# Patient Record
Sex: FEMALE | Race: WHITE | NOT HISPANIC OR LATINO | Employment: UNEMPLOYED | URBAN - NONMETROPOLITAN AREA
[De-identification: names, ages, dates, MRNs, and addresses within clinical notes are randomized per-mention and may not be internally consistent; named-entity substitution may affect disease eponyms.]

---

## 2022-07-15 ENCOUNTER — HOSPITAL ENCOUNTER (INPATIENT)
Facility: HOSPITAL | Age: 37
LOS: 1 days | Discharge: LEFT BEFORE DISCHARGE | End: 2022-07-16
Attending: STUDENT IN AN ORGANIZED HEALTH CARE EDUCATION/TRAINING PROGRAM | Admitting: INTERNAL MEDICINE
Payer: MEDICAID

## 2022-07-15 ENCOUNTER — APPOINTMENT (OUTPATIENT)
Dept: CT IMAGING | Facility: HOSPITAL | Age: 37
End: 2022-07-15
Attending: STUDENT IN AN ORGANIZED HEALTH CARE EDUCATION/TRAINING PROGRAM
Payer: MEDICAID

## 2022-07-15 DIAGNOSIS — J02.9 PHARYNGITIS, UNSPECIFIED ETIOLOGY: ICD-10-CM

## 2022-07-15 DIAGNOSIS — N10 PYELONEPHRITIS, ACUTE: ICD-10-CM

## 2022-07-15 DIAGNOSIS — A41.9 SEPSIS, DUE TO UNSPECIFIED ORGANISM, UNSPECIFIED WHETHER ACUTE ORGAN DYSFUNCTION PRESENT (H): ICD-10-CM

## 2022-07-15 LAB
GROUP A STREP BY PCR: NOT DETECTED
MONOCYTES NFR BLD AUTO: NEGATIVE %

## 2022-07-15 PROCEDURE — 86308 HETEROPHILE ANTIBODY SCREEN: CPT | Performed by: STUDENT IN AN ORGANIZED HEALTH CARE EDUCATION/TRAINING PROGRAM

## 2022-07-15 PROCEDURE — 36415 COLL VENOUS BLD VENIPUNCTURE: CPT | Performed by: STUDENT IN AN ORGANIZED HEALTH CARE EDUCATION/TRAINING PROGRAM

## 2022-07-15 PROCEDURE — 250N000009 HC RX 250: Performed by: STUDENT IN AN ORGANIZED HEALTH CARE EDUCATION/TRAINING PROGRAM

## 2022-07-15 PROCEDURE — 87077 CULTURE AEROBIC IDENTIFY: CPT | Performed by: STUDENT IN AN ORGANIZED HEALTH CARE EDUCATION/TRAINING PROGRAM

## 2022-07-15 PROCEDURE — 99285 EMERGENCY DEPT VISIT HI MDM: CPT | Performed by: STUDENT IN AN ORGANIZED HEALTH CARE EDUCATION/TRAINING PROGRAM

## 2022-07-15 PROCEDURE — 250N000013 HC RX MED GY IP 250 OP 250 PS 637: Performed by: STUDENT IN AN ORGANIZED HEALTH CARE EDUCATION/TRAINING PROGRAM

## 2022-07-15 PROCEDURE — 99285 EMERGENCY DEPT VISIT HI MDM: CPT | Mod: 25

## 2022-07-15 PROCEDURE — 250N000011 HC RX IP 250 OP 636: Performed by: STUDENT IN AN ORGANIZED HEALTH CARE EDUCATION/TRAINING PROGRAM

## 2022-07-15 PROCEDURE — 87081 CULTURE SCREEN ONLY: CPT | Performed by: STUDENT IN AN ORGANIZED HEALTH CARE EDUCATION/TRAINING PROGRAM

## 2022-07-15 PROCEDURE — 70491 CT SOFT TISSUE NECK W/DYE: CPT

## 2022-07-15 PROCEDURE — 87651 STREP A DNA AMP PROBE: CPT | Performed by: STUDENT IN AN ORGANIZED HEALTH CARE EDUCATION/TRAINING PROGRAM

## 2022-07-15 RX ORDER — DEXAMETHASONE SODIUM PHOSPHATE 10 MG/ML
10 INJECTION INTRAMUSCULAR; INTRAVENOUS ONCE
Status: COMPLETED | OUTPATIENT
Start: 2022-07-15 | End: 2022-07-15

## 2022-07-15 RX ORDER — IOPAMIDOL 755 MG/ML
75 INJECTION, SOLUTION INTRAVASCULAR ONCE
Status: COMPLETED | OUTPATIENT
Start: 2022-07-15 | End: 2022-07-15

## 2022-07-15 RX ORDER — ACETAMINOPHEN 325 MG/1
975 TABLET ORAL ONCE
Status: COMPLETED | OUTPATIENT
Start: 2022-07-15 | End: 2022-07-15

## 2022-07-15 RX ADMIN — DEXAMETHASONE SODIUM PHOSPHATE 10 MG: 10 INJECTION INTRAMUSCULAR; INTRAVENOUS at 22:54

## 2022-07-15 RX ADMIN — IOPAMIDOL 75 ML: 755 INJECTION, SOLUTION INTRAVENOUS at 23:21

## 2022-07-15 RX ADMIN — ACETAMINOPHEN 975 MG: 325 TABLET, FILM COATED ORAL at 22:54

## 2022-07-16 VITALS
HEART RATE: 97 BPM | RESPIRATION RATE: 18 BRPM | SYSTOLIC BLOOD PRESSURE: 97 MMHG | DIASTOLIC BLOOD PRESSURE: 55 MMHG | OXYGEN SATURATION: 94 % | HEIGHT: 63 IN | BODY MASS INDEX: 23.59 KG/M2 | TEMPERATURE: 98.9 F | WEIGHT: 133.16 LBS

## 2022-07-16 PROBLEM — J35.01 CHRONIC TONSILLITIS: Status: ACTIVE | Noted: 2022-07-16

## 2022-07-16 PROBLEM — N10 PYELONEPHRITIS, ACUTE: Status: ACTIVE | Noted: 2022-07-16

## 2022-07-16 PROBLEM — G47.10 HYPERSOMNOLENCE: Status: ACTIVE | Noted: 2022-07-16

## 2022-07-16 PROBLEM — R50.9 FEVER: Status: ACTIVE | Noted: 2022-07-16

## 2022-07-16 PROBLEM — J02.9 SORE THROAT: Status: ACTIVE | Noted: 2022-07-16

## 2022-07-16 PROCEDURE — 258N000003 HC RX IP 258 OP 636: Performed by: INTERNAL MEDICINE

## 2022-07-16 PROCEDURE — 258N000003 HC RX IP 258 OP 636

## 2022-07-16 PROCEDURE — 250N000011 HC RX IP 250 OP 636: Performed by: STUDENT IN AN ORGANIZED HEALTH CARE EDUCATION/TRAINING PROGRAM

## 2022-07-16 PROCEDURE — 250N000011 HC RX IP 250 OP 636

## 2022-07-16 PROCEDURE — 120N000001 HC R&B MED SURG/OB

## 2022-07-16 PROCEDURE — 96365 THER/PROPH/DIAG IV INF INIT: CPT

## 2022-07-16 PROCEDURE — 99235 HOSP IP/OBS SAME DATE MOD 70: CPT | Performed by: INTERNAL MEDICINE

## 2022-07-16 RX ORDER — DOXYCYCLINE 100 MG/10ML
INJECTION, POWDER, LYOPHILIZED, FOR SOLUTION INTRAVENOUS
Status: COMPLETED
Start: 2022-07-16 | End: 2022-07-16

## 2022-07-16 RX ORDER — NICOTINE 21 MG/24HR
1 PATCH, TRANSDERMAL 24 HOURS TRANSDERMAL AT BEDTIME
Status: DISCONTINUED | OUTPATIENT
Start: 2022-07-16 | End: 2022-07-16

## 2022-07-16 RX ORDER — NICOTINE 21 MG/24HR
2 PATCH, TRANSDERMAL 24 HOURS TRANSDERMAL AT BEDTIME
Status: DISCONTINUED | OUTPATIENT
Start: 2022-07-16 | End: 2022-07-16 | Stop reason: HOSPADM

## 2022-07-16 RX ORDER — LIDOCAINE 40 MG/G
CREAM TOPICAL
Status: DISCONTINUED | OUTPATIENT
Start: 2022-07-16 | End: 2022-07-16 | Stop reason: HOSPADM

## 2022-07-16 RX ORDER — SODIUM CHLORIDE, SODIUM LACTATE, POTASSIUM CHLORIDE, CALCIUM CHLORIDE 600; 310; 30; 20 MG/100ML; MG/100ML; MG/100ML; MG/100ML
INJECTION, SOLUTION INTRAVENOUS CONTINUOUS
Status: DISCONTINUED | OUTPATIENT
Start: 2022-07-16 | End: 2022-07-16 | Stop reason: HOSPADM

## 2022-07-16 RX ORDER — KETOROLAC TROMETHAMINE 30 MG/ML
30 INJECTION, SOLUTION INTRAMUSCULAR; INTRAVENOUS EVERY 6 HOURS PRN
Status: DISCONTINUED | OUTPATIENT
Start: 2022-07-16 | End: 2022-07-16 | Stop reason: HOSPADM

## 2022-07-16 RX ORDER — ACETAMINOPHEN 325 MG/1
325 TABLET ORAL EVERY 6 HOURS PRN
Status: DISCONTINUED | OUTPATIENT
Start: 2022-07-16 | End: 2022-07-16 | Stop reason: HOSPADM

## 2022-07-16 RX ORDER — ONDANSETRON 2 MG/ML
4 INJECTION INTRAMUSCULAR; INTRAVENOUS EVERY 6 HOURS PRN
Status: DISCONTINUED | OUTPATIENT
Start: 2022-07-16 | End: 2022-07-16 | Stop reason: HOSPADM

## 2022-07-16 RX ORDER — ONDANSETRON 4 MG/1
4 TABLET, ORALLY DISINTEGRATING ORAL EVERY 6 HOURS PRN
Status: DISCONTINUED | OUTPATIENT
Start: 2022-07-16 | End: 2022-07-16 | Stop reason: HOSPADM

## 2022-07-16 RX ORDER — SODIUM CHLORIDE 9 MG/ML
INJECTION, SOLUTION INTRAVENOUS
Status: COMPLETED
Start: 2022-07-16 | End: 2022-07-16

## 2022-07-16 RX ADMIN — TAZOBACTAM SODIUM AND PIPERACILLIN SODIUM 4.5 G: 500; 4 INJECTION, SOLUTION INTRAVENOUS at 01:54

## 2022-07-16 RX ADMIN — SODIUM CHLORIDE, POTASSIUM CHLORIDE, SODIUM LACTATE AND CALCIUM CHLORIDE: 600; 310; 30; 20 INJECTION, SOLUTION INTRAVENOUS at 02:36

## 2022-07-16 RX ADMIN — SODIUM CHLORIDE 100 ML: 9 INJECTION, SOLUTION INTRAVENOUS at 00:55

## 2022-07-16 RX ADMIN — DOXYCYCLINE 100 MG: 100 INJECTION, POWDER, LYOPHILIZED, FOR SOLUTION INTRAVENOUS at 00:55

## 2022-07-16 ASSESSMENT — ACTIVITIES OF DAILY LIVING (ADL)
WALKING_OR_CLIMBING_STAIRS_DIFFICULTY: NO
TOILETING_ISSUES: NO
HEARING_DIFFICULTY_OR_DEAF: NO
CHANGE_IN_FUNCTIONAL_STATUS_SINCE_ONSET_OF_CURRENT_ILLNESS/INJURY: NO
DIFFICULTY_EATING/SWALLOWING: NO
DOING_ERRANDS_INDEPENDENTLY_DIFFICULTY: NO
CONCENTRATING,_REMEMBERING_OR_MAKING_DECISIONS_DIFFICULTY: NO
ADLS_ACUITY_SCORE: 35
FALL_HISTORY_WITHIN_LAST_SIX_MONTHS: NO
ADLS_ACUITY_SCORE: 22
DIFFICULTY_COMMUNICATING: NO
WEAR_GLASSES_OR_BLIND: NO
DRESSING/BATHING_DIFFICULTY: NO
ADLS_ACUITY_SCORE: 39

## 2022-07-16 NOTE — ED PROVIDER NOTES
"  History     Chief Complaint   Patient presents with     Generalized Body Aches     Pharyngitis     HPI  Juan Saeed is a 36 year old female with hx of polysubstance abuse who presents to the ED today complaining of fever and sore throat suprapubic tenderness.  She initially presented to the emergency department in [place] where she was seen by a colleague and was found to meet sepsis criteria, had a fluid bolus received antibiotics blood cultures were started she was found to have pyelonephritis and a pharyngitis with a negative strep and a negative COVID.  The intention was to admit her, but the patient says that at that point she was confused that she had a UTI and left.  He tells me that she went home took some Xanax and came to our emergency department here with the same complaints and symptoms.    Allergies:  No Known Allergies    Problem List:    Patient Active Problem List    Diagnosis Date Noted     Pyelonephritis, acute 07/16/2022     Priority: Medium     Fever 07/16/2022     Priority: Medium     Sore throat 07/16/2022     Priority: Medium     Chronic tonsillitis 07/16/2022     Priority: Medium     Hypersomnolence 07/16/2022     Priority: Medium        Past Medical History:    No past medical history on file.    Past Surgical History:    No past surgical history on file.    Family History:    No family history on file.    Social History:  Marital Status:  Single [1]        Medications:    No current outpatient medications on file.        Review of Systems  A complete review of systems was performed and is otherwise negative.     Physical Exam   BP: 132/86  Pulse: 111  Temp: (!) 100.9  F (38.3  C)  Resp: 16  Height: 157.5 cm (5' 2.01\") (from [facility] on 7/15/22 @ 1622)  Weight: 56.7 kg (125 lb) (from [facility] on 7/15/22 @ 1622)  SpO2: 96 %      Physical Exam  Constitutional: Alert and conversant. NAD   HENT: NCAT   Eyes: Normal pupils   Neck: supple   CV: Tachycardic rate, regular " rhythm  Pulmonary/Chest: Non-labored respirations, clear to auscultation bilaterally   Abdominal: Soft, suprapubic-tender, non-distended, no Chun sign, no rebound or guarding, no pain at McBurney's point, positive CVA tenderness on the left  MSK: GONZALEZ.   Neuro: Somnolent but arousable  Skin: Warm and dry. No diaphoresis. No rashes on exposed skin    Psych: Appropriate mood and affect     ED Course              ED Course as of 07/16/22 0302   Fri Jul 15, 2022   2137 Spoke with Angelina Velazquez who saw the patient in [place], diagnosed her with Pyelo, looked toxic, lactate and pro-wilian normal. UTI (no blood, 100+ WBC), tachy and fever at 103. 30cc/kg bolus. Pt Removed IV and ran out. Received 2g ceftriaxone, mag. CTA chest OK. No CT abdomen. Antipyretics refused. Blood cultures. CXR OK. She would have been admitted but left AMA.   2207 On top of what Angelina Velazquez has already done, I will add a mononucleosis screen, the patient's throat is very erythematous, her symptoms seem quite consistent with strep pharyngitis, sometimes these tests are falsely negative so we will recheck and run a strep culture as well.  In addition there is fairly good amount of swelling, I will give some Tylenol and some Decadron to help with her symptoms and send her for soft tissue CT of her neck to look for potential abscess     Sat Jul 16, 2022   0049 CT shows pharyngitis but no abscess nothing that needs surgical intervention, will continue with plan for coverage with antibiotics she got 2 g of ceftriaxone and I gave her doxycycline here this should cover gonorrhea chlamydia (an idea astutely suggested by Ms. Velazquez) and any UTI bugs.  She is persistently tachycardic and should be admitted for IV antibiotic   0301 Patient admitted to the medicine.  While discussing the case with the hospitalist, we discovered the patient has a history of being ESBL E. coli in her urine that was previously sensitive to Zosyn, will give her Zosyn now      Procedures            Results for orders placed or performed during the hospital encounter of 07/15/22 (from the past 24 hour(s))   Mononucleosis screen   Result Value Ref Range    Mononucleosis Screen Negative Negative   Group A Streptococcus PCR Throat Swab    Specimen: Throat; Swab   Result Value Ref Range    Group A strep by PCR Not Detected Not Detected    Narrative    The Xpert Xpress Strep A test, performed on the [employer], is a rapid, qualitative in vitro diagnostic test for the detection of Streptococcus pyogenes (Group A ß-hemolytic Streptococcus, Strep A) in throat swab specimens from patients with signs and symptoms of pharyngitis. The Xpert Xpress Strep A test can be used as an aid in the diagnosis of Group A Streptococcal pharyngitis. The assay is not intended to monitor treatment for Group A Streptococcus infections. The Xpert Xpress Strep A test utilizes an automated real-time polymerase chain reaction (PCR) to detect Streptococcus pyogenes DNA.       Medications   doxycycline (VIBRAMYCIN) 100 mg in sodium chloride 0.9 % 100 mL intermittent infusion (100 mg Intravenous Not Given 7/16/22 0056)   lidocaine 1 % 0.1-1 mL (has no administration in time range)   lidocaine (LMX4) cream (has no administration in time range)   sodium chloride (PF) 0.9% PF flush 3 mL (3 mLs Intracatheter Given 7/16/22 0253)   sodium chloride (PF) 0.9% PF flush 3 mL (has no administration in time range)   lactated ringers infusion ( Intravenous New Bag 7/16/22 0236)   ondansetron (ZOFRAN ODT) ODT tab 4 mg (has no administration in time range)     Or   ondansetron (ZOFRAN) injection 4 mg (has no administration in time range)   nicotine Patch in Place (has no administration in time range)   nicotine (NICODERM CQ) 21 MG/24HR 24 hr patch 1 patch (has no administration in time range)   piperacillin-tazobactam (ZOSYN) infusion 3.375 g (has no administration in time range)   benzocaine-menthol (CEPACOL) 15-3.6  MG lozenge 1 lozenge (has no administration in time range)   acetaminophen (TYLENOL) tablet 325 mg (has no administration in time range)   ketorolac (TORADOL) injection 30 mg (has no administration in time range)   acetaminophen (TYLENOL) tablet 975 mg (975 mg Oral Given 7/15/22 2254)   dexamethasone (DECADRON) injectable solution used ORALLY 10 mg (10 mg Oral Given 7/15/22 2254)   sodium chloride (PF) 0.9% PF flush 60 mL (50 mLs Intravenous Given 7/15/22 2322)   iopamidol (ISOVUE-370) solution 75 mL (75 mLs Intravenous Given 7/15/22 2321)   sodium chloride 0.9 % infusion (  Stopped 7/16/22 0153)   doxycycline (VIBRAMYCIN) 100 MG vial (  Stopped 7/16/22 0153)   piperacillin-tazobactam (ZOSYN) intermittent infusion 4.5 g (4.5 g Intravenous New Bag 7/16/22 0154)       Assessments & Plan (with Medical Decision Making)     I have reviewed the nursing notes.    I have reviewed the findings, diagnosis, plan and need for follow up with the patient.      There are no discharge medications for this patient.      Final diagnoses:   Pyelonephritis, acute   Pharyngitis, unspecified etiology   Sepsis, due to unspecified organism, unspecified whether acute organ dysfunction present (H)       7/15/2022   [facility]     John Polk MD  07/16/22 3457

## 2022-07-16 NOTE — DISCHARGE INSTRUCTIONS

## 2022-07-16 NOTE — ED TRIAGE NOTES
"    Pt states she was seen at [place] today and states covid and strep negative. States they told her she had a severe infection \"but I don't know what they were thinking so I left\". Pt states sore throat and body aches started today.  "

## 2022-07-16 NOTE — PLAN OF CARE
Pt woke up from sleeping and stated that she had to leave right now. AMA papers were printed off and Dr. Galvez was notified. He talked with the pt about the risks of leaving. He then gave me a verbal order to remove her IV access. IV access was removed.  Dr. Galvez then called Dr. Polk up from the ER and he had a talk with the pt. The pt fell asleep while Dr. Polk was talking to her. Per MD orders pt is to be left alone in her room so she can sleep comfortably. If she wakes up and still wants to leave AMA Dr. Doyle is to be called so he can talk with the pt.

## 2022-07-16 NOTE — H&P
[facility]    History and Physical - Hospitalist Service       Date of Admission:  7/15/2022    Assessment & Plan      Juan Saeed is a 36 year old female admitted on 7/15/2022. She comes to ED after leaving [facility] ED. C/o sore throat, myalgias. Work up in [place] ended up in diagnosing her with Pyelonephritis. 2 gr of Rocephin given. ROS positive w/u also included throat cultures, obtained in [place], but not reported yet.      1. Pyelonephritis. H/o UTI. Limited availability to review her past cultures (lab analyzer is down).Contineu zosyn which was started in ED. F/u cultures obtained in [place]    2. Sore throat. She has had sore throat in the past. H/o strep throat but today's rapid strep is negative. Cultures obtained. Pending. I believe patient has chronic tonsillitis and needs ENT consult to review indications for tonsillectomy.    3. H/o substance abuse. Currently on subaxone 8 mg q 8 hr. Will continue    4. H/o suicidal ideation. Denies this admission.     5. Hypersomnolence. Patient stated that after leaving [place]'s ED, she went home and took bunch of xanax. Not sure that she didn't take more non-prescribed or illicit drugs. Her urine was negative when tested in [facility].      Diet:   advance. Will start with full liquid  DVT Prophylaxis: Pneumatic Compression Devices  Lynch Catheter: Not present  Central Lines: None  Cardiac Monitoring: None  Code Status:   Full code      Clinically Significant Risk Factors Present on Admission                          Disposition Plan   Back home in 3-4 days    The patient's care was discussed with the ED  Team.    Ivonne Doyle MD  Hospitalist Service  [facility]  Securely message with the Vocera Web Console (learn more here)  Text page via [email or web address] Paging/Directory         ______________________________________________________________________    Chief Complaint   Sore throat, myalgias, back pain    History is obtained  "from electronic health record and emergency department physician. Patient is very somnolent and couldn't provide CC and reliable history.     History of Present Illness   Juan Saeed is a 36 year old female who has PMH of pharyngitis, substance abuse, suicidal ideation, UTI comes to ED after leaving [facility] ED. C/o sore throat, myalgias. She comes to [facility] ED with complaint of sore throat which started the morning of presentation to ED. She stated that she woke up with a pounding headache, sore throat, dizziness and extreme fatigue. EMS stated that her temperature was 103. When presented to ED her temperature was 99.7    Work up in [place] ended up in diagnosing her with pyelonephritis. 2 gr of Rocephin given. ROS positive w/u also included throat cultures, obtained in [place], but not reported yet.   She left [facility] ED because she did not trust the place, \"my throat is killing me, and you are worried about my urine\".   She was toxic in [place] with HR  120 and temp 103. Work up completed in [place] included throat cultures, CBC, CMP, INR, d-d, UA and reported as: No PE, normal lactic acid, very abnormal UA with  and high LE.She was given 2 gr Rocephin in [place].   After leaving [place]'s ED, she went home and admitted taking bunch of xanax. Then, she presented to [facility] ED and was found very somnolent. ED physician attributed hypersomnolence to meds not to systemic effect of her illness.   Her rapid strep throat done in [place] and here - negative. Monospot also negative. Soft tissue CT to r/o peritonsillar abscess - revealed only pharyngitis.    Urine WBC's 100+ (A) 0 - 8 /HPF   07/15/2022 7:18 PM CDT [facility]     Urine RBC's None Seen 0 - 3 /HPF   07/15/2022 7:18 PM CDT [facility]     Urine Bacteria Many (A) None Seen /HPF   07/15/2022 7:18 PM CDT [facility]      Her urine tox screen was positive " "for amphetamines and buprenorphine, but no benzos detected (this test was done in [place], prior she left [place] and \"took\" xanax).   Patient was seen in ED and she was very somnolent to me, did not speak but was able to protect airways and saturating 95-96% on RA.   ED treatment: she was given zosyn and doxycycline. Doxycycline was started to treat \"possible\" STD which I doubt that exist.       Review of Systems    Limited. She was very sleepy and ROS was not obtainable.     Past Medical History    I have reviewed this patient's medical history and updated it with pertinent information if needed.   As per  HPI.     Past Surgical History   I have reviewed this patient's surgical history and updated it with pertinent information if needed.  Unremarkable.     Social History   I have reviewed this patient's social history and updated it with pertinent information if needed.  Unable to obtain.     Family History     Unable to obtain.      Prior to Admission Medications   None     Allergies   No Known Allergies    Physical Exam   Vital Signs: Temp: 98.9  F (37.2  C) Temp src: Oral BP: 114/68 Pulse: 115   Resp: 18 SpO2: 100 % O2 Device: None (Room air)    Weight: 0 lbs 0 oz  General: very somnolent, arousable to voice. Covered with warm sweat.   HEENT: NC/AT. She has very enlarged tonsils, comedones, posterior pharynx with thick white secretion.   Neck: flat veins  Cardiac; regular, mildly tachycardic.   Pulmonary: breathing not labored. No wheezing  Abd: soft, not tender, BS present, no pulsatile masses.   : bladder not palpable.   MS: no joint effusion.   Skin: flushed, wet, no rash, multiple tattoos.   Neurological: hypersomnolent, arousable to voice, moves all extremities.   Psychiatric: hypersomnolent.   Lymphatic / hematologic: few painful neck LAD. No other LN enlargement. NO petechiae, no ecchymoses.     Data   Data reviewed today: I reviewed all medications, new labs   No lab results found in last 7 days.  Labs " pending. Lab analyzer is down. [place]'s labs included in HPI.   CT chest (done in [place]) and CT soft tissue:   CT ANGIO CHEST  FINDINGS: The pulmonary arteries are contrast opacified. No filling defect to suggest a pulmonary embolus. Lungs are clear. No infiltrate. No osseous abnormality. Upper abdominal imaging is negative. No mediastinal or hilar adenopathy. Mildly patulous esophagus.  IMPRESSION: No pulmonary embolus.    CT neck today: Pharynx: Diffuse mucosal edema in the pharynx. Pharyngeal tonsils are edematous with striated enhancement pattern. No peritonsillar fluid collection.  N/A    \    N/A    /    N/A   N N/A    L N/A    N/A    N/A    N/A /   ------------------------------------ N/A   ALT N/A   AST N/A   AP N/A   ALB N/A   Ca N/A  N/A    N/A    N/A \    % RETIC N/A    LDH N/A  Troponin N/A    BNP N/A    CK N/A  INR N/A   PTT N/A    D-dimer N/A    Fibrinogen N/A    Antithrombin N/A  Ferritin N/A  CRP N/A    IL-6 N/A  No results found for this or any previous visit (from the past 24 hour(s)).

## 2022-07-16 NOTE — PLAN OF CARE
"Goal Outcome Evaluation:  [facility] Inpatient Admission Note:    Patient admitted to 3230/3230-1 at approximately 0145 via wheel chair accompanied by nurse from emergency room . Report received from Luiza in SBAR format at 0130 via telephone. Patient transferred to bed via self.. Patient is alert and oriented X 3, denies pain; rates at 0 on 0-10 scale.  Patient oriented to room, unit, hourly rounding, and plan of care. Explained admission packet and patient handbook with patient bill of rights brochure. Will continue to monitor and document as needed.     Inpatient Nursing criteria listed below was met:    Health care directives status obtained and documented: No    Patient identifies a surrogate decision maker: No If yes, who:N/A Contact Information:N/A     If initial lactic acid greater than 2.0, repeat lactic acid drawn within one hour of arrival to unit: NA. If no, state reason: N/A    Clergy visit ordered if patient requests: No    Skin issues/needs documented: No    Isolation Patient: yes Education given, correct sign in place and documentation row added to PCS:  Yes    Fall Prevention Yes: Care plan updated, education given and documented, sticker and magnet in place: Yes    Care Plan initiated: Yes    Education Documented (including assessment): Yes    Patient has discharge needs : No If yes, please explain:N/A   Reason for hospital stay:  Acute pyelonephritis   Living situation PTA: Lives at home with her boyfriend   Most recent vitals: BP 97/55 (BP Location: Right arm, Patient Position: Supine)   Pulse 97   Temp 98.9  F (37.2  C) (Oral)   Resp 18   Ht 1.6 m (5' 3\")   Wt 60.4 kg (133 lb 2.5 oz)   LMP  (LMP Unknown)   SpO2 94%   BMI 23.59 kg/m      Pain Management:  Pt denies having pain.   LOC:  Alert and orientated to self. Not orientated to place, time, and situation.   Cardiac:  Apical pulse regular.   Respiratory:  All fields clear, equal bilateral.   GI: WDL  :  Due to her UTI and " pyelonephritis she has frequency and urgency issues.   Skin Issues:  No adjustments needed at this time    IVF:  LR running at 125v mL/hr.   ABX:  IV Zosyn, IV Doxycycline     Nutrition: Adequate. Clear liquid diet.   Ambulation: Assist x 1 with use of gait belt. Pt will not keep her eyes open when she is walking. She is generally lethargic at this point in time.   Safety:  Bed in the low position, call light within reach, and makes needs known.     Comments: VSS. Afebrile. Pt had taken a significant amount of Xanax prior to coming into the hospital. She has been sleeping and lethargic since she arrived. She woke up and wanted to leave AMA. Dr. Polk talked her into sleep again. We are trying to keep her here as long as possible so we can get antibiotics into her. Face to face report given with opportunity to observe patient.    No face to face report given.     Annemarie Arreaga RN   7/16/2022  7:15  AM    Patient left AMA. Swearing as she left the floor.

## 2022-07-16 NOTE — ED NOTES
Pt returned from CT. Very sleepy but arouses to voice. 20 gauge IV placed in left AC with ultrasound. Pt swabbed for Chlamydia and Gonorrhea.

## 2022-07-17 LAB
BACTERIA SPEC CULT: ABNORMAL
BACTERIA SPEC CULT: NORMAL

## 2022-07-17 NOTE — DISCHARGE SUMMARY
[facility]  Hospitalist Discharge Summary      Date of Admission:  7/15/2022  Date of Discharge:  7/16/2022  6:49 AM  Discharging Provider: Ru Nelson MD  Discharge Service: Hospitalist Service    Discharge Diagnoses   Active Problems:    Pyelonephritis, acute    Fever    Chronic tonsillitis    Hypersomnolence      Follow-ups Needed After Discharge       Unresulted Labs Ordered in the Past 30 Days of this Admission     Date and Time Order Name Status Description    7/15/2022 11:20 PM Neisseria gonorrhoeae culture Preliminary     7/15/2022 10:01 PM Beta hemolytic Strep Group A Culture Preliminary       These results will be followed up by PCP    Discharge Disposition   Patient left the hospital AGAINST MEDICAL ADVICE    Condition at discharge: Guarded      Hospital Course    Juan Saeed is a 36 year old female admitted on 7/15/2022. She comes to ED after leaving [facility] ED. C/o sore throat, myalgias. Work up in [place] ended up in diagnosing her with Pyelonephritis. 2 gr of Rocephin given. ROS positive w/u also included throat cultures, obtained in [place], but not reported yet.      1. Pyelonephritis.   Patient history of UTI.  The patient was continued on Zosyn IV.  Within a few hours of admission, patient left AGAINST MEDICAL ADVICE    2. Sore throat.   Patient has had a sore throat in the past.  Rapid test was negative and culture was sent but pending at the time of discharge.  In light of patient's chronic tonsillitis, ENT consult was recommended to evaluate for possible tonsillectomy.  Patient again however left AGAINST MEDICAL ADVICE    3.  Polysubstance abuse  Patient has history of opioid use disorder, methamphetamine abuse, nicotine dependence with tobacco.  Patient was continued on Suboxone 8 mg every 8 hour.  And patient was placed on nicotine patch.  Patient was offered doubling the dosage of nicotine patch prior to leaving as stated reason for leaving was wanting to  smoke cigarettes.  Patient however declined and left AGAINST MEDICAL ADVICE.    4. H/o suicidal ideation.   Denies this admission.     5. Hypersomnolence.   Patient stated that after leaving [place]'s ED, she went home and took bunch of xanax. Not sure that she didn't take more non-prescribed or illicit drugs. Her urine was negative when tested in [place].     Consultations This Hospital Stay   None    Code Status   Prior    Time Spent on this Encounter   I spent less than or equal to 30 minutes discharging this patient, as patient left without being seen by me       Ru Nelson MD  [facility]  [address] [place] [id number]  Phone: [phone]  Fax: [phone]  ______________________________________________________________________    Physical Exam   Vital Signs:                    Weight: 133 lbs 2.53 oz  Exam was not performed as patient left AGAINST MEDICAL ADVICE without being seen        Primary Care Physician   Physician No Ref-Primary    Discharge Orders   No discharge procedures on file.    Significant Results and Procedures   Most Recent 3 CBC's:No lab results found.  Most Recent 3 BMP's:No lab results found.  Most Recent Urinalysis:No lab results found.,   Results for orders placed or performed during the hospital encounter of 07/15/22   Soft tissue neck CT w contrast    Narrative    PROCEDURE: CT SOFT TISSUE NECK W CONTRAST 7/15/2022 11:33 PM    HISTORY: throat swelling    COMPARISONS: None.    Meds/Dose Given: ISOVUE 370 75mL    TECHNIQUE: Axial postcontrast enhanced images with coronal and  sagittal reformatted images.    FINDINGS: Is heterogeneous enhancement of the pharyngeal tonsils with  some associated narrowing of the airway. There is no discrete abscess  associated with this. There is no prevertebral soft tissue swelling.    Salivary glands appear fairly normal. There are no enlarged cervical  lymph nodes.    Larynx and trachea have a normal appearance. Thyroid gland  enhances  homogeneously.    No bony abnormality is seen. No focal abnormality is seen in the lung  apices. No abnormality is seen in the visualized brain.         Impression    IMPRESSION: Heterogeneous enhancement of and enlargement of the  pharyngeal tonsils consistent with tonsillitis/pharyngitis.    KRISTIN LOWE MD         SYSTEM ID:  [id number]       Discharge Medications   There are no discharge medications for this patient.    Allergies   No Known Allergies

## 2024-03-10 ENCOUNTER — HEALTH MAINTENANCE LETTER (OUTPATIENT)
Age: 39
End: 2024-03-10

## 2025-03-16 ENCOUNTER — HEALTH MAINTENANCE LETTER (OUTPATIENT)
Age: 40
End: 2025-03-16